# Patient Record
(demographics unavailable — no encounter records)

---

## 2024-12-08 NOTE — HISTORY OF PRESENT ILLNESS
[FreeTextEntry1] : Moved to independent living facility in [place].  Was admitted to [facility] in [place] due to fall.  Has been unsteady on his feet and has balance issues but refuses to use a walker and uses only a cane.  Had stress echo in Sept '23.  No chest pain, palpitations, dizziness or syncope.

## 2024-12-08 NOTE — PHYSICAL EXAM
[Well Nourished] : well nourished [No Acute Distress] : no acute distress [Normal Venous Pressure] : normal venous pressure [Normal S1, S2] : normal S1, S2 [Clear Lung Fields] : clear lung fields [Soft] : abdomen soft [Non Tender] : non-tender [Gait - Sufficient for Exercise Testing] : gait - sufficient for exercise testing [No Edema] : no edema [Normal Radial B/L] : normal radial B/L [No Focal Deficits] : no focal deficits [Alert and Oriented] : alert and oriented [de-identified] : 1/6 systolic murmur [de-identified] : rash on forehead [de-identified] : No major memory defecits noted

## 2024-12-08 NOTE — PHYSICAL EXAM
[Well Nourished] : well nourished [No Acute Distress] : no acute distress [Normal Venous Pressure] : normal venous pressure [Normal S1, S2] : normal S1, S2 [Clear Lung Fields] : clear lung fields [Soft] : abdomen soft [Non Tender] : non-tender [Gait - Sufficient for Exercise Testing] : gait - sufficient for exercise testing [No Edema] : no edema [Normal Radial B/L] : normal radial B/L [No Focal Deficits] : no focal deficits [Alert and Oriented] : alert and oriented [de-identified] : 1/6 systolic murmur [de-identified] : rash on forehead [de-identified] : No major memory defecits noted

## 2024-12-08 NOTE — DISCUSSION/SUMMARY
[FreeTextEntry1] : Successful complex PCI to calcified prox LAD at [facility] in June 2022. No ischemia on stress echo in Sept '23 Was in [place] for falls.  Has abnormal balance at times.  Going for PT/OT. Advised to use walker.  follow-up in 3 months

## 2024-12-08 NOTE — HISTORY OF PRESENT ILLNESS
[FreeTextEntry1] : Moved to independent living facility in [place].  Was admitted to [facility] in [place] due to fall.  Has been unsteady on his feet and has balance issues but refuses to use a walker and uses only a cane.  Had stress echo in Sept '23.  No chest pain, palpitations, dizziness or syncope.

## 2025-03-07 NOTE — PHYSICAL EXAM
[Well Nourished] : well nourished [No Acute Distress] : no acute distress [Normal Venous Pressure] : normal venous pressure [Normal S1, S2] : normal S1, S2 [Clear Lung Fields] : clear lung fields [Soft] : abdomen soft [Non Tender] : non-tender [Gait - Sufficient for Exercise Testing] : gait - sufficient for exercise testing [No Edema] : no edema [Normal Radial B/L] : normal radial B/L [No Focal Deficits] : no focal deficits [Alert and Oriented] : alert and oriented [de-identified] : 1/6 systolic murmur [de-identified] : rash on forehead [de-identified] : No major memory defecits noted

## 2025-03-07 NOTE — DISCUSSION/SUMMARY
[FreeTextEntry1] : Successful complex PCI to calcified prox LAD at [place] in June 2022. No ischemia on stress echo in Sept '23 Was in [place] for falls.  Has abnormal balance at times.  Going for PT/OT. Advised to use walker.  follow-up in 3 months

## 2025-03-07 NOTE — DISCUSSION/SUMMARY
[FreeTextEntry1] : Successful complex PCI to calcified prox LAD at [facility] in June 2022. No ischemia on stress echo in Sept '23 Was in [place] for falls.  Has abnormal balance at times.  Going for PT/OT. Advised to use walker.  follow-up in 3 months

## 2025-03-07 NOTE — HISTORY OF PRESENT ILLNESS
[FreeTextEntry1] : Moved to independent living facility in [place].  Was admitted to [facility] in [place] due to fall.  Has been unsteady on his feet and has balance issues but refuses to use a walker and uses only a cane.  Had stress echo in Sept '23.  No chest pain, palpitations, dizziness or syncope. Having more urinary frequency and  incontinence. Had prostatectomy for prostate CA many years ago at [facility]

## 2025-03-07 NOTE — HISTORY OF PRESENT ILLNESS
[FreeTextEntry1] : Moved to independent living facility in [place].  Was admitted to [facility] in [place] due to fall.  Has been unsteady on his feet and has balance issues but refuses to use a walker and uses only a cane.  Had stress echo in Sept '23.  No chest pain, palpitations, dizziness or syncope. Having more urinary frequency and  incontinence. [place] for [place] [facility]

## 2025-03-07 NOTE — PHYSICAL EXAM
[Well Nourished] : well nourished [No Acute Distress] : no acute distress [Normal Venous Pressure] : normal venous pressure [Normal S1, S2] : normal S1, S2 [Clear Lung Fields] : clear lung fields [Soft] : abdomen soft [Non Tender] : non-tender [Gait - Sufficient for Exercise Testing] : gait - sufficient for exercise testing [No Edema] : no edema [Normal Radial B/L] : normal radial B/L [No Focal Deficits] : no focal deficits [Alert and Oriented] : alert and oriented [de-identified] : 1/6 systolic murmur [de-identified] : rash on forehead [de-identified] : No major memory defecits noted